# Patient Record
Sex: MALE | Race: WHITE | NOT HISPANIC OR LATINO | URBAN - METROPOLITAN AREA
[De-identification: names, ages, dates, MRNs, and addresses within clinical notes are randomized per-mention and may not be internally consistent; named-entity substitution may affect disease eponyms.]

---

## 2025-07-23 ENCOUNTER — ANCILLARY PROCEDURE (OUTPATIENT)
Dept: URGENT CARE | Age: 25
End: 2025-07-23

## 2025-07-23 ENCOUNTER — OFFICE VISIT (OUTPATIENT)
Dept: URGENT CARE | Age: 25
End: 2025-07-23

## 2025-07-23 VITALS
WEIGHT: 191 LBS | OXYGEN SATURATION: 98 % | HEART RATE: 105 BPM | BODY MASS INDEX: 27.35 KG/M2 | RESPIRATION RATE: 16 BRPM | HEIGHT: 70 IN

## 2025-07-23 DIAGNOSIS — R05.1 ACUTE COUGH: ICD-10-CM

## 2025-07-23 DIAGNOSIS — J40 BRONCHITIS: Primary | ICD-10-CM

## 2025-07-23 DIAGNOSIS — R68.89 FLU-LIKE SYMPTOMS: ICD-10-CM

## 2025-07-23 DIAGNOSIS — H65.03 NON-RECURRENT ACUTE SEROUS OTITIS MEDIA OF BOTH EARS: ICD-10-CM

## 2025-07-23 LAB
POC CORONAVIRUS SARS-COV-2 PCR: NEGATIVE
POC HUMAN RHINOVIRUS PCR: NEGATIVE
POC INFLUENZA A VIRUS PCR: NEGATIVE
POC INFLUENZA B VIRUS PCR: NEGATIVE
POC RESPIRATORY SYNCYTIAL VIRUS PCR: NEGATIVE

## 2025-07-23 PROCEDURE — 87631 RESP VIRUS 3-5 TARGETS: CPT | Performed by: PHYSICIAN ASSISTANT

## 2025-07-23 PROCEDURE — 1036F TOBACCO NON-USER: CPT | Performed by: PHYSICIAN ASSISTANT

## 2025-07-23 PROCEDURE — 99203 OFFICE O/P NEW LOW 30 MIN: CPT | Performed by: PHYSICIAN ASSISTANT

## 2025-07-23 PROCEDURE — 71046 X-RAY EXAM CHEST 2 VIEWS: CPT | Performed by: PHYSICIAN ASSISTANT

## 2025-07-23 PROCEDURE — 3008F BODY MASS INDEX DOCD: CPT | Performed by: PHYSICIAN ASSISTANT

## 2025-07-23 RX ORDER — BENZONATATE 200 MG/1
200 CAPSULE ORAL 3 TIMES DAILY PRN
Qty: 30 CAPSULE | Refills: 0 | Status: SHIPPED | OUTPATIENT
Start: 2025-07-23

## 2025-07-23 RX ORDER — PREDNISONE 20 MG/1
40 TABLET ORAL DAILY
Qty: 10 TABLET | Refills: 0 | Status: SHIPPED | OUTPATIENT
Start: 2025-07-23 | End: 2025-07-28

## 2025-07-23 RX ORDER — ALBUTEROL SULFATE 90 UG/1
2 INHALANT RESPIRATORY (INHALATION) EVERY 6 HOURS PRN
Qty: 18 G | Refills: 0 | Status: SHIPPED | OUTPATIENT
Start: 2025-07-23 | End: 2026-07-23

## 2025-07-23 NOTE — PATIENT INSTRUCTIONS
"Acute bronchitis in adults    The Basics  Written by the doctors and editors at [facility]  What is bronchitis?  This is an infection that causes a cough. It happens when the tubes that carry air into the lungs, called the \"bronchi,\" get infected (figure 1).  Usually, bronchitis happens after a person gets a cold or the flu. The viruses that cause the cold or flu infect the bronchi and irritate them. Antibiotics do not work on infections caused by viruses.  Bronchitis can also happen when a person gets an infection called \"whooping cough,\" but this is much less common. Whooping cough is caused by bacteria that can infect the bronchi. Most people get vaccines to prevent whooping cough, but the vaccine doesn't always work. Your doctor will be able to tell if you have whooping cough by doing an exam and listening to your cough.  This article is about \"acute\" bronchitis. This is different from \"chronic\" bronchitis, which is a lung disease that most often affects people who smoke.  What are the symptoms of bronchitis?  The most common symptoms are:  ? A cough that can last up to a few weeks  ? Coughing up mucus that is clear, yellow, or green - Green mucus does not always mean that you have a bacterial infection.  You might also have other cold or flu symptoms, like a stuffy nose, sore throat, or headache. People with bronchitis do not usually get a fever.  Will I need tests?  Most people with bronchitis do not need a test. But if your doctor or nurse is not sure what is causing your cough, they might do tests. For example, they might order a chest X-ray. Or if they think that you might have COVID-19, they will test you for the virus that causes the infection.  How is bronchitis treated?  Bronchitis almost always goes away on its own. But the cough can take up to 3 weeks to get better, and sometimes even longer.  Doctors do not usually treat bronchitis with antibiotic medicines. That's because bronchitis is usually caused " by a virus, and antibiotics kill bacteria, not viruses. Also, antibiotics can actually cause other problems.  To feel better, you can treat your cold and flu symptoms. You can:  ? Get lots of rest, and drink plenty of liquids.  ? Drink hot tea.  ? Suck on cough drops or hard candy.  ? Take over-the-counter cough and cold medicines.  ? Breathe in warm, moist air, like steam from the shower. Some people find that it helps to use a cool-mist humidifier.  ? Take a pain-relieving medicine if you have cold or flu symptoms like headache, muscle aches, or joint pain.  Avoid smoking or being around others who smoke. This can make your cough worse.  How can I keep from getting bronchitis again?  You can reduce your chance of getting bronchitis again by keeping the germs that cause bronchitis out of your body. One of the best ways to do this is to wash your hands often with soap and water. If there is no sink nearby, you can use a hand gel with alcohol in it to clean your hands.  How can I keep from spreading germs?  In addition to washing your hands often, cover your mouth with your elbow when you sneeze or cough. Using your elbow keeps you from getting germs on your hands. If you use a tissue, throw the tissue away and wash your hands.  When should I call the doctor?  Call for advice if you have:  ? A fever higher than 100.4°F (38°C), or chills  ? Chest pain when you cough, trouble breathing, or coughing up blood  ? A barking cough that makes it hard to talk  ? Cough and weight loss that you cannot explain  ? Symptoms that are not getting better after 3 weeks

## 2025-07-23 NOTE — PROGRESS NOTES
"Subjective   Patient ID: Jose Brasher is a 24 y.o. male. They present today with a chief complaint of Cough (Possible pneumonia exposure. //).    History of Present Illness  HPI  This is a 24-year-old male presents urgent care for cough.  Patient states that he has had fatigue for 1 to 2 weeks.  He is also had a cough which turned productive last night.  States he is bringing up some greenish-reddish phlegm.  He has popping in his ears.  Denies fevers, chills, shortness of breath.  Does smoke marijuana and vape.  No history of asthma or COPD.  To feel members recently diagnosed with pneumonia.  Past Medical History  Allergies as of 07/23/2025 - Reviewed 07/23/2025   Allergen Reaction Noted    Morphine Other and Unknown 07/23/2025    Penicillins Rash and Unknown 04/01/2019       Prescriptions Prior to Admission[1]     Medical History[2]    Surgical History[3]     reports that he has never smoked. He has never used smokeless tobacco.    Review of Systems  Review of Systems                               Objective    Vitals:    07/23/25 1002   Pulse: (!) 113   Resp: 16   SpO2: 98%   Weight: 86.6 kg (191 lb)   Height: 1.778 m (5' 10\")     No LMP for male patient.    Physical Exam  Physical Exam:    General: Vitals noted no distress. Afebrile. Normal phonation, no stridor, no trismus    ENT: Fluid behind bilateral TMs without erythema or bulging.. Left external auditory canal is unremarkable. Right external auditory canal is unremarkable. Mastoids nontender. Normal conjunctival. Eyes are PERRLA. Posterior oropharynx without exudate or swelling. Uvula is in the midline and nonedematous. No peritonsillar abscess. No Jarad's Angina.    Neck: Supple. No meningismus through full range of motion. No anterior cervical lymphadenopathy. No posterior cervical chain lymphadenopathy    Cardiac: Tachycardic, regular rhythm    Lungs: Good aeration throughout. No adventitious breath sounds.    Abdomen: Soft, nontender, nonsurgical " throughout.     Extremities: No peripheral edema    Skin: No rash    Neuro: No gross neurological deficits      Procedures    Point of Care Test & Imaging Results from this visit  Results for orders placed or performed in visit on 07/23/25   POCT SPOTFIRE R/ST Panel Mini w/COVID ([facility]) manually resulted    Specimen: Swab   Result Value Ref Range    POC Sars-Cov-2 PCR Negative Negative    POC Respiratory Syncytial Virus PCR Negative Negative    POC Influenza A Virus PCR Negative Negative    POC Influenza B Virus PCR Negative Negative    POC Human Rhinovirus PCR Negative Negative      Imaging  XR chest 2 views  Result Date: 7/23/2025  Normal chest.   Signed by: Li Hernandez 7/23/2025 10:51 AM Dictation workstation:   [id number]      Cardiology, Vascular, and Other Imaging  No other imaging results found for the past 2 days      Diagnostic study results (if any) were reviewed by Endy Cm PA-C.    Assessment/Plan   Allergies, medications, history, and pertinent labs/EKGs/Imaging reviewed by Endy Cm PA-C.     Medical Decision Making  MDM:      Summary: This is a 24-year-old male here for cough. Vital signs were reviewed. Patient is well-appearing nontoxic on exam. Differential diagnosis includes but not limited to viral URI, bronchitis, pneumonia, asthma.  Chest x-ray was unremarkable.  This is likely postviral cough/bronchitis.  Will treat with prednisone, albuterol inhaler, Tessalon Perles.  Vaping cessation encouraged.  Stable for discharge. Follow-up with PCP. Return to urgent care or go to the emergency department if symptoms worsen or if new symptoms develop.    Orders and Diagnoses  Diagnoses and all orders for this visit:  Bronchitis  -     predniSONE (Deltasone) 20 mg tablet; Take 2 tablets (40 mg) by mouth once daily for 5 days.  -     benzonatate (Tessalon) 200 mg capsule; Take 1 capsule (200 mg) by mouth 3 times a day as needed for cough. Do not crush or chew.  -     albuterol 90  mcg/actuation inhaler; Inhale 2 puffs every 6 hours if needed for wheezing.  Flu-like symptoms  -     POCT SPOTFIRE R/ST Panel Mini w/COVID ([facility]) manually resulted  Acute cough  -     XR chest 2 views; Future  Non-recurrent acute serous otitis media of both ears      Medical Admin Record      Patient disposition: Home    Electronically signed by Endy Cm PA-C  11:39 AM           [1] (Not in a hospital admission)   [2] No past medical history on file.  [3] No past surgical history on file.